# Patient Record
Sex: FEMALE | Race: OTHER | URBAN - METROPOLITAN AREA
[De-identification: names, ages, dates, MRNs, and addresses within clinical notes are randomized per-mention and may not be internally consistent; named-entity substitution may affect disease eponyms.]

---

## 2019-01-07 NOTE — PATIENT DISCUSSION
MYOPIA, OU- DISC OPT OF REFRACTIVE SX-VS-GLS/[id number]. PT UNDERSTANDS OPTIONS AND DESIRES TO PROCEED WITH REFRACTIVE SX TO IMPROVE VA AND REDUCE DEPENDENCY ON GLS/CTLS.

## 2019-01-10 NOTE — PATIENT DISCUSSION
Continue: prednisolone acetate (PF) (prednisolone acetate (pf)): drops,suspension: 1% 1 drop as directed as directed into both eyes

## 2019-01-10 NOTE — PATIENT DISCUSSION
MYOPIA, OU- DISC OPT OF REFRACTIVE SX-VS-GLS/[id number]. PT UNDERSTANDS OPTIONS AND DESIRES TO PROCEED WITH REFRACTIVE SX TO IMPROVE VA AND REDUCE DEPENDENCY ON GLS/CTLS.

## 2019-02-18 NOTE — PATIENT DISCUSSION
Stopped Today: prednisolone acetate (PF) (prednisolone acetate (pf)): drops,suspension: 1% 1 drop as directed as directed into both eyes

## 2019-07-19 NOTE — PATIENT DISCUSSION
6 MONTH POST LASIK OU - DOING WELL, RELEASED TO ROUTINE CARE. REMINDED PATIENT TO GET ANNUAL EXAMS TO KEEP THEIR VISION FOR LIFE PLAN INTACT.

## 2022-06-14 ENCOUNTER — NEW PATIENT (OUTPATIENT)
Dept: URBAN - METROPOLITAN AREA CLINIC 49 | Facility: CLINIC | Age: 82
End: 2022-06-14

## 2022-06-14 DIAGNOSIS — H25.12: ICD-10-CM

## 2022-06-14 DIAGNOSIS — H43.813: ICD-10-CM

## 2022-06-14 DIAGNOSIS — H25.811: ICD-10-CM

## 2022-06-14 PROCEDURE — 92004 COMPRE OPH EXAM NEW PT 1/>: CPT

## 2022-06-14 ASSESSMENT — VISUAL ACUITY
OD_CC: 20/25-3
OU_SC: J7
OS_SC: CF 3FT
OU_CC: J1+
OD_SC: CF 3FT
OS_CC: 20/25-1

## 2022-06-14 ASSESSMENT — TONOMETRY
OD_IOP_MMHG: 15
OS_IOP_MMHG: 15